# Patient Record
Sex: FEMALE | Race: WHITE | Employment: FULL TIME | URBAN - METROPOLITAN AREA
[De-identification: names, ages, dates, MRNs, and addresses within clinical notes are randomized per-mention and may not be internally consistent; named-entity substitution may affect disease eponyms.]

---

## 2017-10-19 ENCOUNTER — LAB ENCOUNTER (OUTPATIENT)
Dept: LAB | Age: 47
End: 2017-10-19
Attending: FAMILY MEDICINE
Payer: COMMERCIAL

## 2017-10-19 DIAGNOSIS — Z00.00 ROUTINE GENERAL MEDICAL EXAMINATION AT A HEALTH CARE FACILITY: Primary | ICD-10-CM

## 2017-10-19 PROCEDURE — 83036 HEMOGLOBIN GLYCOSYLATED A1C: CPT

## 2017-10-19 PROCEDURE — 84443 ASSAY THYROID STIM HORMONE: CPT

## 2017-10-19 PROCEDURE — 80061 LIPID PANEL: CPT

## 2017-10-19 PROCEDURE — 80053 COMPREHEN METABOLIC PANEL: CPT

## 2017-10-19 PROCEDURE — 85025 COMPLETE CBC W/AUTO DIFF WBC: CPT

## 2017-10-19 PROCEDURE — 36415 COLL VENOUS BLD VENIPUNCTURE: CPT

## 2017-10-31 ENCOUNTER — HOSPITAL ENCOUNTER (OUTPATIENT)
Dept: MAMMOGRAPHY | Age: 47
Discharge: HOME | End: 2017-10-31
Attending: FAMILY MEDICINE
Payer: COMMERCIAL

## 2017-10-31 DIAGNOSIS — Z12.31 ENCOUNTER FOR SCREENING MAMMOGRAM FOR MALIGNANT NEOPLASM OF BREAST: ICD-10-CM

## 2017-10-31 PROCEDURE — 77067 SCR MAMMO BI INCL CAD: CPT | Performed by: FAMILY MEDICINE

## 2017-11-18 ENCOUNTER — HOSPITAL ENCOUNTER (EMERGENCY)
Age: 47
Discharge: HOME | End: 2017-11-18
Attending: EMERGENCY MEDICINE
Payer: COMMERCIAL

## 2017-11-18 ENCOUNTER — APPOINTMENT (OUTPATIENT)
Dept: ULTRASOUND IMAGING | Age: 47
End: 2017-11-18
Attending: EMERGENCY MEDICINE
Payer: COMMERCIAL

## 2017-11-18 VITALS
TEMPERATURE: 98 F | BODY MASS INDEX: 43.32 KG/M2 | WEIGHT: 260 LBS | HEART RATE: 90 BPM | DIASTOLIC BLOOD PRESSURE: 80 MMHG | SYSTOLIC BLOOD PRESSURE: 135 MMHG | RESPIRATION RATE: 18 BRPM | HEIGHT: 65 IN | OXYGEN SATURATION: 98 %

## 2017-11-18 DIAGNOSIS — M79.604 PAIN IN RIGHT LEG: Primary | ICD-10-CM

## 2017-11-18 PROCEDURE — 99284 EMERGENCY DEPT VISIT MOD MDM: CPT

## 2017-11-18 PROCEDURE — 93971 EXTREMITY STUDY: CPT | Performed by: EMERGENCY MEDICINE

## 2017-11-18 RX ORDER — CELECOXIB 200 MG/1
200 CAPSULE ORAL 2 TIMES DAILY
Qty: 20 CAPSULE | Refills: 0 | Status: SHIPPED | OUTPATIENT
Start: 2017-11-18 | End: 2021-09-22

## 2017-11-18 RX ORDER — TRAMADOL HYDROCHLORIDE 50 MG/1
TABLET ORAL EVERY 4 HOURS PRN
Qty: 20 TABLET | Refills: 0 | Status: SHIPPED | OUTPATIENT
Start: 2017-11-18 | End: 2017-11-25

## 2017-11-18 RX ORDER — TRAMADOL HYDROCHLORIDE 50 MG/1
100 TABLET ORAL ONCE
Status: COMPLETED | OUTPATIENT
Start: 2017-11-18 | End: 2017-11-18

## 2017-11-19 NOTE — ED INITIAL ASSESSMENT (HPI)
Pt is having pain that radiates from her right calf into her posterior thigh. Pt states the pain is made worse by driving a car.

## 2017-11-19 NOTE — ED PROVIDER NOTES
Patient Seen in: [facility] Emergency Department In [place]    History   Patient presents with:  Back Pain (musculoskeletal)    Stated Complaint: leg/buttocks pain    HPI    77-year-old female presents for evaluation of right leg pain.   Patient describes (36.9 °C) (Temporal)   Resp 18   Ht 165.1 cm (5' 5\")   Wt 117.9 kg   LMP 10/31/2017 (Exact Date)   SpO2 98%   BMI 43.27 kg/m²         Physical Exam   Constitutional: She is oriented to person, place, and time. Cardiovascular: Intact distal pulses.     Pu (four) hours as needed for Pain., Script printed, Disp-20 tablet, R-0    celecoxib (CELEBREX) 200 MG Oral Cap  Take 1 capsule (200 mg total) by mouth 2 (two) times daily. , Normal, Disp-20 capsule, R-0

## 2019-10-23 ENCOUNTER — HOSPITAL ENCOUNTER (OUTPATIENT)
Dept: MAMMOGRAPHY | Age: 49
Discharge: HOME | End: 2019-10-23
Attending: FAMILY MEDICINE
Payer: COMMERCIAL

## 2019-10-23 DIAGNOSIS — Z12.31 ENCOUNTER FOR SCREENING MAMMOGRAM FOR MALIGNANT NEOPLASM OF BREAST: ICD-10-CM

## 2019-10-23 PROCEDURE — 77063 BREAST TOMOSYNTHESIS BI: CPT | Performed by: FAMILY MEDICINE

## 2019-10-23 PROCEDURE — 77067 SCR MAMMO BI INCL CAD: CPT | Performed by: FAMILY MEDICINE

## 2020-10-04 ENCOUNTER — HOSPITAL ENCOUNTER (OUTPATIENT)
Dept: MAMMOGRAPHY | Age: 50
Discharge: HOME | End: 2020-10-04
Attending: FAMILY MEDICINE
Payer: COMMERCIAL

## 2020-10-04 DIAGNOSIS — Z12.31 ENCOUNTER FOR SCREENING MAMMOGRAM FOR MALIGNANT NEOPLASM OF BREAST: ICD-10-CM

## 2020-10-04 PROCEDURE — 77067 SCR MAMMO BI INCL CAD: CPT | Performed by: FAMILY MEDICINE

## 2020-10-04 PROCEDURE — 77063 BREAST TOMOSYNTHESIS BI: CPT | Performed by: FAMILY MEDICINE

## 2021-06-13 ENCOUNTER — APPOINTMENT (OUTPATIENT)
Dept: ULTRASOUND IMAGING | Age: 51
End: 2021-06-13
Attending: EMERGENCY MEDICINE
Payer: COMMERCIAL

## 2021-06-13 ENCOUNTER — APPOINTMENT (OUTPATIENT)
Dept: GENERAL RADIOLOGY | Age: 51
End: 2021-06-13
Attending: EMERGENCY MEDICINE
Payer: COMMERCIAL

## 2021-06-13 ENCOUNTER — HOSPITAL ENCOUNTER (EMERGENCY)
Age: 51
Discharge: HOME | End: 2021-06-13
Attending: EMERGENCY MEDICINE
Payer: COMMERCIAL

## 2021-06-13 ENCOUNTER — APPOINTMENT (OUTPATIENT)
Dept: CT IMAGING | Age: 51
End: 2021-06-13
Attending: EMERGENCY MEDICINE
Payer: COMMERCIAL

## 2021-06-13 VITALS
BODY MASS INDEX: 43 KG/M2 | RESPIRATION RATE: 16 BRPM | OXYGEN SATURATION: 99 % | DIASTOLIC BLOOD PRESSURE: 81 MMHG | SYSTOLIC BLOOD PRESSURE: 130 MMHG | WEIGHT: 260 LBS | TEMPERATURE: 99 F | HEART RATE: 74 BPM

## 2021-06-13 DIAGNOSIS — R07.89 LEFT CHEST PRESSURE: Primary | ICD-10-CM

## 2021-06-13 PROCEDURE — 93971 EXTREMITY STUDY: CPT | Performed by: EMERGENCY MEDICINE

## 2021-06-13 PROCEDURE — 99285 EMERGENCY DEPT VISIT HI MDM: CPT

## 2021-06-13 PROCEDURE — 71045 X-RAY EXAM CHEST 1 VIEW: CPT | Performed by: EMERGENCY MEDICINE

## 2021-06-13 PROCEDURE — 85379 FIBRIN DEGRADATION QUANT: CPT | Performed by: EMERGENCY MEDICINE

## 2021-06-13 PROCEDURE — 71275 CT ANGIOGRAPHY CHEST: CPT | Performed by: EMERGENCY MEDICINE

## 2021-06-13 PROCEDURE — 85025 COMPLETE CBC W/AUTO DIFF WBC: CPT | Performed by: EMERGENCY MEDICINE

## 2021-06-13 PROCEDURE — 36415 COLL VENOUS BLD VENIPUNCTURE: CPT

## 2021-06-13 PROCEDURE — 93010 ELECTROCARDIOGRAM REPORT: CPT

## 2021-06-13 PROCEDURE — 84484 ASSAY OF TROPONIN QUANT: CPT | Performed by: EMERGENCY MEDICINE

## 2021-06-13 PROCEDURE — 80053 COMPREHEN METABOLIC PANEL: CPT | Performed by: EMERGENCY MEDICINE

## 2021-06-13 PROCEDURE — 93005 ELECTROCARDIOGRAM TRACING: CPT

## 2021-06-13 NOTE — ED PROVIDER NOTES
Patient Seen in: [facility] Emergency Department In [place]      History   Patient presents with:  Numbness Weakness    Stated Complaint: \"whole body doesnt feel right\"    HPI/Subjective:   HPI    58-year-old with a history of osteoarthritis, previous l • Visual impairment     glasses   • Wears glasses               Past Surgical History:   Procedure Laterality Date   • CHOLECYSTECTOMY     • COLONOSCOPY     • EYE SURGERY      eyelid cyst removal in [facility]   • KNEE ARTHROSCOPY Left 1980-1990s    3 of Alkaline Phosphatase 170 (*)     A/G Ratio 0.9 (*)     All other components within normal limits   D-DIMER - Abnormal; Notable for the following components:    D-Dimer 1.17 (*)     All other components within normal limits   CBC W/ DIFFERENTIAL - Abnorm the upper abdomen demonstrate cholecystectomy changes.     BONES:  Mild diffuse degenerative change.  No bony lesion or fracture.     OTHER:  Image degradation due to patient motion.      LLE venous doppler: No DVT  EXTREMITY EXAMINED:  Left lower extremit

## 2021-06-13 NOTE — ED INITIAL ASSESSMENT (HPI)
MULTIPLE COMPLAINTS. NUMBNESS TO A COUPLE TOES FOR ABOUT 1 WEEK.  PRESSURE BEHIND THE NECK, FRONT OF HEAD AND NEAR COLLARBONE. STATES \"JUST DONT FEEL RIGHT\"

## 2021-06-14 ENCOUNTER — TELEPHONE (OUTPATIENT)
Dept: CARDIOLOGY | Age: 51
End: 2021-06-14

## 2021-06-19 ENCOUNTER — LAB ENCOUNTER (OUTPATIENT)
Dept: LAB | Age: 51
End: 2021-06-19
Attending: INTERNAL MEDICINE
Payer: COMMERCIAL

## 2021-06-19 DIAGNOSIS — Z01.818 PRE-OP TESTING: ICD-10-CM

## 2021-06-22 PROBLEM — Z86.010 PERSONAL HISTORY OF COLONIC POLYPS: Status: ACTIVE | Noted: 2021-06-22

## 2021-06-22 PROBLEM — K63.5 COLON POLYP: Status: ACTIVE | Noted: 2021-06-22

## 2021-06-22 PROBLEM — R07.9 CHEST PAIN, UNSPECIFIED: Status: ACTIVE | Noted: 2021-06-22

## 2021-06-22 PROBLEM — K64.8 INTERNAL HEMORRHOIDS WITHOUT COMPLICATION: Status: ACTIVE | Noted: 2021-06-22

## 2021-06-22 PROBLEM — Z86.0100 PERSONAL HISTORY OF COLONIC POLYPS: Status: ACTIVE | Noted: 2021-06-22

## 2021-07-26 ENCOUNTER — ORDER TRANSCRIPTION (OUTPATIENT)
Dept: ADMINISTRATIVE | Facility: HOSPITAL | Age: 51
End: 2021-07-26

## 2021-07-26 DIAGNOSIS — Z13.6 SCREENING FOR CARDIOVASCULAR CONDITION: Primary | ICD-10-CM

## 2021-08-10 ENCOUNTER — HOSPITAL ENCOUNTER (OUTPATIENT)
Dept: CT IMAGING | Age: 51
Discharge: HOME | End: 2021-08-10
Attending: FAMILY MEDICINE

## 2021-08-10 DIAGNOSIS — Z13.6 SCREENING FOR CARDIOVASCULAR CONDITION: ICD-10-CM

## 2021-09-15 ENCOUNTER — LAB ENCOUNTER (OUTPATIENT)
Dept: LAB | Age: 51
End: 2021-09-15
Attending: INTERNAL MEDICINE
Payer: COMMERCIAL

## 2021-09-15 DIAGNOSIS — A04.8 HELICOBACTER PYLORI INFECTION: ICD-10-CM

## 2021-09-15 PROCEDURE — 83013 H PYLORI (C-13) BREATH: CPT

## 2021-09-17 LAB — H. PYLORI BREATH TEST: NEGATIVE

## 2021-09-17 NOTE — PROGRESS NOTES
Date: 2021    To: Bryan Rea  : 1970     I hope this letter finds you doing well. I am writing to inform you of the following:      The results of your recent breath test for H. Pylori was negative       Please call the office at [phone]

## 2021-09-20 ENCOUNTER — LAB ENCOUNTER (OUTPATIENT)
Dept: LAB | Age: 51
End: 2021-09-20
Attending: INTERNAL MEDICINE
Payer: COMMERCIAL

## 2021-09-20 DIAGNOSIS — Z98.890 HISTORY OF ESOPHAGOGASTRODUODENOSCOPY (EGD): ICD-10-CM

## 2021-09-21 LAB — SARS-COV-2 RNA RESP QL NAA+PROBE: NOT DETECTED

## 2021-09-23 PROBLEM — B96.81 HELICOBACTER PYLORI GASTRITIS: Status: ACTIVE | Noted: 2021-09-23

## 2021-09-23 PROBLEM — K25.3 GASTRIC ULCER, ACUTE: Status: ACTIVE | Noted: 2021-09-23

## 2021-09-23 PROBLEM — K29.70 HELICOBACTER PYLORI GASTRITIS: Status: ACTIVE | Noted: 2021-09-23

## 2021-10-10 ENCOUNTER — LAB ENCOUNTER (OUTPATIENT)
Dept: LAB | Facility: HOSPITAL | Age: 51
End: 2021-10-10
Attending: FAMILY MEDICINE
Payer: COMMERCIAL

## 2021-10-10 DIAGNOSIS — E53.9 VITAMIN B DEFICIENCY: ICD-10-CM

## 2021-10-10 DIAGNOSIS — Z00.00 ROUTINE GENERAL MEDICAL EXAMINATION AT A HEALTH CARE FACILITY: ICD-10-CM

## 2021-10-10 DIAGNOSIS — Z86.39 PERSONAL HISTORY OF NUTRITIONAL DEFICIENCY: Primary | ICD-10-CM

## 2021-10-10 DIAGNOSIS — E55.9 VITAMIN D DEFICIENCY: ICD-10-CM

## 2021-10-10 PROCEDURE — 80053 COMPREHEN METABOLIC PANEL: CPT

## 2021-10-10 PROCEDURE — 84443 ASSAY THYROID STIM HORMONE: CPT

## 2021-10-10 PROCEDURE — 80061 LIPID PANEL: CPT

## 2021-10-10 PROCEDURE — 82306 VITAMIN D 25 HYDROXY: CPT

## 2021-10-10 PROCEDURE — 83036 HEMOGLOBIN GLYCOSYLATED A1C: CPT

## 2021-10-10 PROCEDURE — 83550 IRON BINDING TEST: CPT

## 2021-10-10 PROCEDURE — 83540 ASSAY OF IRON: CPT

## 2021-10-10 PROCEDURE — 85025 COMPLETE CBC W/AUTO DIFF WBC: CPT

## 2021-10-10 PROCEDURE — 82607 VITAMIN B-12: CPT

## 2021-10-10 PROCEDURE — 36415 COLL VENOUS BLD VENIPUNCTURE: CPT

## 2021-10-28 ENCOUNTER — HOSPITAL ENCOUNTER (OUTPATIENT)
Dept: MAMMOGRAPHY | Age: 51
Discharge: HOME | End: 2021-10-28
Attending: FAMILY MEDICINE
Payer: COMMERCIAL

## 2021-10-28 DIAGNOSIS — Z12.31 VISIT FOR SCREENING MAMMOGRAM: ICD-10-CM

## 2021-10-28 PROCEDURE — 77063 BREAST TOMOSYNTHESIS BI: CPT | Performed by: FAMILY MEDICINE

## 2021-10-28 PROCEDURE — 77067 SCR MAMMO BI INCL CAD: CPT | Performed by: FAMILY MEDICINE

## 2022-10-31 ENCOUNTER — HOSPITAL ENCOUNTER (OUTPATIENT)
Dept: MAMMOGRAPHY | Age: 52
Discharge: HOME | End: 2022-10-31
Attending: FAMILY MEDICINE
Payer: COMMERCIAL

## 2022-10-31 DIAGNOSIS — Z12.31 ENCOUNTER FOR SCREENING MAMMOGRAM FOR MALIGNANT NEOPLASM OF BREAST: ICD-10-CM

## 2022-10-31 PROCEDURE — 77067 SCR MAMMO BI INCL CAD: CPT | Performed by: FAMILY MEDICINE

## 2022-10-31 PROCEDURE — 77063 BREAST TOMOSYNTHESIS BI: CPT | Performed by: FAMILY MEDICINE

## 2023-11-02 ENCOUNTER — HOSPITAL ENCOUNTER (OUTPATIENT)
Dept: MAMMOGRAPHY | Age: 53
Discharge: HOME | End: 2023-11-02
Attending: FAMILY MEDICINE
Payer: COMMERCIAL

## 2023-11-02 DIAGNOSIS — Z12.31 ENCOUNTER FOR SCREENING MAMMOGRAM FOR MALIGNANT NEOPLASM OF BREAST: ICD-10-CM

## 2023-11-02 PROCEDURE — 77067 SCR MAMMO BI INCL CAD: CPT | Performed by: FAMILY MEDICINE

## 2023-11-02 PROCEDURE — 77063 BREAST TOMOSYNTHESIS BI: CPT | Performed by: FAMILY MEDICINE

## 2024-09-27 ENCOUNTER — LAB ENCOUNTER (OUTPATIENT)
Dept: LAB | Age: 54
End: 2024-09-27
Attending: NURSE PRACTITIONER
Payer: COMMERCIAL

## 2024-09-27 DIAGNOSIS — E78.01 FAMILIAL HYPERCHOLESTEROLEMIA: Primary | ICD-10-CM

## 2024-09-27 LAB
ALBUMIN SERPL-MCNC: 4.7 G/DL (ref 3.2–4.8)
ALBUMIN/GLOB SERPL: 1.6 {RATIO} (ref 1–2)
ALP LIVER SERPL-CCNC: 155 U/L
ALT SERPL-CCNC: 11 U/L
ANION GAP SERPL CALC-SCNC: 7 MMOL/L (ref 0–18)
AST SERPL-CCNC: 18 U/L (ref ?–34)
BILIRUB SERPL-MCNC: 0.6 MG/DL (ref 0.3–1.2)
BUN BLD-MCNC: 10 MG/DL (ref 9–23)
CALCIUM BLD-MCNC: 10.1 MG/DL (ref 8.7–10.4)
CHLORIDE SERPL-SCNC: 106 MMOL/L (ref 98–112)
CHOLEST SERPL-MCNC: 211 MG/DL (ref ?–200)
CO2 SERPL-SCNC: 28 MMOL/L (ref 21–32)
CREAT BLD-MCNC: 0.82 MG/DL
EGFRCR SERPLBLD CKD-EPI 2021: 84 ML/MIN/1.73M2 (ref 60–?)
FASTING PATIENT LIPID ANSWER: YES
FASTING STATUS PATIENT QL REPORTED: YES
GLOBULIN PLAS-MCNC: 2.9 G/DL (ref 2–3.5)
GLUCOSE BLD-MCNC: 85 MG/DL (ref 70–99)
HDLC SERPL-MCNC: 51 MG/DL (ref 40–59)
LDLC SERPL CALC-MCNC: 144 MG/DL (ref ?–100)
NONHDLC SERPL-MCNC: 160 MG/DL (ref ?–130)
OSMOLALITY SERPL CALC.SUM OF ELEC: 290 MOSM/KG (ref 275–295)
POTASSIUM SERPL-SCNC: 4.2 MMOL/L (ref 3.5–5.1)
PROT SERPL-MCNC: 7.6 G/DL (ref 5.7–8.2)
SODIUM SERPL-SCNC: 141 MMOL/L (ref 136–145)
TRIGL SERPL-MCNC: 90 MG/DL (ref 30–149)
VLDLC SERPL CALC-MCNC: 17 MG/DL (ref 0–30)

## 2024-09-27 PROCEDURE — 80053 COMPREHEN METABOLIC PANEL: CPT

## 2024-09-27 PROCEDURE — 80061 LIPID PANEL: CPT

## 2024-09-27 PROCEDURE — 36415 COLL VENOUS BLD VENIPUNCTURE: CPT

## 2024-11-09 ENCOUNTER — HOSPITAL ENCOUNTER (OUTPATIENT)
Dept: MAMMOGRAPHY | Age: 54
Discharge: HOME | End: 2024-11-09
Attending: FAMILY MEDICINE
Payer: COMMERCIAL

## 2024-11-09 DIAGNOSIS — Z12.31 ENCOUNTER FOR SCREENING MAMMOGRAM FOR MALIGNANT NEOPLASM OF BREAST: ICD-10-CM

## 2024-11-09 PROCEDURE — 77067 SCR MAMMO BI INCL CAD: CPT | Performed by: FAMILY MEDICINE

## 2024-11-09 PROCEDURE — 77063 BREAST TOMOSYNTHESIS BI: CPT | Performed by: FAMILY MEDICINE

## 2024-11-22 ENCOUNTER — APPOINTMENT (OUTPATIENT)
Dept: CT IMAGING | Age: 54
End: 2024-11-22
Attending: EMERGENCY MEDICINE
Payer: COMMERCIAL

## 2024-11-22 ENCOUNTER — APPOINTMENT (OUTPATIENT)
Dept: GENERAL RADIOLOGY | Age: 54
End: 2024-11-22
Attending: EMERGENCY MEDICINE
Payer: COMMERCIAL

## 2024-11-22 ENCOUNTER — HOSPITAL ENCOUNTER (OUTPATIENT)
Facility: HOSPITAL | Age: 54
Setting detail: OBSERVATION
Discharge: HOME | End: 2024-11-23
Attending: EMERGENCY MEDICINE | Admitting: HOSPITALIST
Payer: COMMERCIAL

## 2024-11-22 DIAGNOSIS — R07.89 CHEST PAIN, ATYPICAL: Primary | ICD-10-CM

## 2024-11-22 DIAGNOSIS — R00.0 RAPID HEART RATE: ICD-10-CM

## 2024-11-22 DIAGNOSIS — R03.0 ELEVATED BLOOD PRESSURE READING: ICD-10-CM

## 2024-11-22 LAB
ALBUMIN SERPL-MCNC: 4.9 G/DL (ref 3.2–4.8)
ALBUMIN/GLOB SERPL: 1.6 {RATIO} (ref 1–2)
ALP LIVER SERPL-CCNC: 162 U/L
ALT SERPL-CCNC: 14 U/L
ANION GAP SERPL CALC-SCNC: 8 MMOL/L (ref 0–18)
APTT PPP: 24.8 SECONDS (ref 23–36)
AST SERPL-CCNC: 19 U/L (ref ?–34)
BASOPHILS # BLD AUTO: 0.1 X10(3) UL (ref 0–0.2)
BASOPHILS NFR BLD AUTO: 0.6 %
BILIRUB SERPL-MCNC: 0.5 MG/DL (ref 0.3–1.2)
BUN BLD-MCNC: 7 MG/DL (ref 9–23)
CALCIUM BLD-MCNC: 10.3 MG/DL (ref 8.7–10.4)
CHLORIDE SERPL-SCNC: 105 MMOL/L (ref 98–112)
CO2 SERPL-SCNC: 26 MMOL/L (ref 21–32)
CREAT BLD-MCNC: 0.86 MG/DL
D DIMER PPP FEU-MCNC: 1.16 UG/ML FEU (ref ?–0.55)
EGFRCR SERPLBLD CKD-EPI 2021: 80 ML/MIN/1.73M2 (ref 60–?)
EOSINOPHIL # BLD AUTO: 0.44 X10(3) UL (ref 0–0.7)
EOSINOPHIL NFR BLD AUTO: 2.6 %
ERYTHROCYTE [DISTWIDTH] IN BLOOD BY AUTOMATED COUNT: 12.9 %
GLOBULIN PLAS-MCNC: 3.1 G/DL (ref 2–3.5)
GLUCOSE BLD-MCNC: 86 MG/DL (ref 70–99)
HCT VFR BLD AUTO: 48.3 %
HGB BLD-MCNC: 16.8 G/DL
IMM GRANULOCYTES # BLD AUTO: 0.08 X10(3) UL (ref 0–1)
IMM GRANULOCYTES NFR BLD: 0.5 %
INR BLD: 0.93 (ref 0.8–1.2)
LYMPHOCYTES # BLD AUTO: 4.99 X10(3) UL (ref 1–4)
LYMPHOCYTES NFR BLD AUTO: 29.5 %
MCH RBC QN AUTO: 30.9 PG (ref 26–34)
MCHC RBC AUTO-ENTMCNC: 34.8 G/DL (ref 31–37)
MCV RBC AUTO: 89 FL
MONOCYTES # BLD AUTO: 1.66 X10(3) UL (ref 0.1–1)
MONOCYTES NFR BLD AUTO: 9.8 %
MORPHOLOGY: NORMAL
NEUTROPHILS # BLD AUTO: 9.66 X10 (3) UL (ref 1.5–7.7)
NEUTROPHILS # BLD AUTO: 9.66 X10(3) UL (ref 1.5–7.7)
NEUTROPHILS NFR BLD AUTO: 57 %
OSMOLALITY SERPL CALC.SUM OF ELEC: 285 MOSM/KG (ref 275–295)
PLATELET # BLD AUTO: 310 10(3)UL (ref 150–450)
PLATELET MORPHOLOGY: NORMAL
POTASSIUM SERPL-SCNC: 3.2 MMOL/L (ref 3.5–5.1)
PROT SERPL-MCNC: 8 G/DL (ref 5.7–8.2)
PROTHROMBIN TIME: 12.3 SECONDS (ref 11.6–14.8)
RBC # BLD AUTO: 5.43 X10(6)UL
SODIUM SERPL-SCNC: 139 MMOL/L (ref 136–145)
T4 FREE SERPL-MCNC: 1.2 NG/DL (ref 0.8–1.7)
TROPONIN I SERPL HS-MCNC: <3 NG/L
TSI SER-ACNC: 3.51 UIU/ML (ref 0.55–4.78)
WBC # BLD AUTO: 16.9 X10(3) UL (ref 4–11)

## 2024-11-22 PROCEDURE — 99223 1ST HOSP IP/OBS HIGH 75: CPT | Performed by: HOSPITALIST

## 2024-11-22 PROCEDURE — 70450 CT HEAD/BRAIN W/O DYE: CPT | Performed by: EMERGENCY MEDICINE

## 2024-11-22 PROCEDURE — 71275 CT ANGIOGRAPHY CHEST: CPT | Performed by: EMERGENCY MEDICINE

## 2024-11-22 PROCEDURE — 71045 X-RAY EXAM CHEST 1 VIEW: CPT | Performed by: EMERGENCY MEDICINE

## 2024-11-22 RX ORDER — ONDANSETRON 2 MG/ML
4 INJECTION INTRAMUSCULAR; INTRAVENOUS EVERY 6 HOURS PRN
Status: DISCONTINUED | OUTPATIENT
Start: 2024-11-22 | End: 2024-11-23

## 2024-11-22 RX ORDER — CETIRIZINE HYDROCHLORIDE 10 MG/1
10 TABLET ORAL DAILY
Status: DISCONTINUED | OUTPATIENT
Start: 2024-11-23 | End: 2024-11-23

## 2024-11-22 RX ORDER — IOHEXOL 350 MG/ML
100 INJECTION, SOLUTION INTRAVENOUS
Status: COMPLETED | OUTPATIENT
Start: 2024-11-22 | End: 2024-11-22

## 2024-11-22 RX ORDER — ATORVASTATIN CALCIUM 10 MG/1
10 TABLET, FILM COATED ORAL NIGHTLY
Status: DISCONTINUED | OUTPATIENT
Start: 2024-11-22 | End: 2024-11-23

## 2024-11-22 RX ORDER — POTASSIUM CHLORIDE 1500 MG/1
40 TABLET, EXTENDED RELEASE ORAL ONCE
Status: COMPLETED | OUTPATIENT
Start: 2024-11-22 | End: 2024-11-22

## 2024-11-22 RX ORDER — SODIUM CHLORIDE 9 MG/ML
INJECTION, SOLUTION INTRAVENOUS CONTINUOUS
Status: ACTIVE | OUTPATIENT
Start: 2024-11-22 | End: 2024-11-22

## 2024-11-22 RX ORDER — ASPIRIN 81 MG/1
324 TABLET, CHEWABLE ORAL ONCE
Status: COMPLETED | OUTPATIENT
Start: 2024-11-22 | End: 2024-11-22

## 2024-11-22 RX ORDER — HYDRALAZINE HYDROCHLORIDE 20 MG/ML
10 INJECTION INTRAMUSCULAR; INTRAVENOUS EVERY 4 HOURS PRN
Status: DISCONTINUED | OUTPATIENT
Start: 2024-11-22 | End: 2024-11-23

## 2024-11-22 RX ORDER — ACETAMINOPHEN 500 MG
500 TABLET ORAL EVERY 4 HOURS PRN
Status: DISCONTINUED | OUTPATIENT
Start: 2024-11-22 | End: 2024-11-23

## 2024-11-22 RX ORDER — LABETALOL HYDROCHLORIDE 5 MG/ML
20 INJECTION, SOLUTION INTRAVENOUS ONCE
Status: COMPLETED | OUTPATIENT
Start: 2024-11-22 | End: 2024-11-22

## 2024-11-22 RX ORDER — ECHINACEA PURPUREA EXTRACT 125 MG
1 TABLET ORAL
Status: DISCONTINUED | OUTPATIENT
Start: 2024-11-22 | End: 2024-11-23

## 2024-11-22 RX ORDER — PROCHLORPERAZINE EDISYLATE 5 MG/ML
5 INJECTION INTRAMUSCULAR; INTRAVENOUS EVERY 8 HOURS PRN
Status: DISCONTINUED | OUTPATIENT
Start: 2024-11-22 | End: 2024-11-23

## 2024-11-22 RX ORDER — ENOXAPARIN SODIUM 100 MG/ML
40 INJECTION SUBCUTANEOUS DAILY
Status: DISCONTINUED | OUTPATIENT
Start: 2024-11-23 | End: 2024-11-23

## 2024-11-22 RX ORDER — LABETALOL HYDROCHLORIDE 5 MG/ML
20 INJECTION, SOLUTION INTRAVENOUS EVERY 6 HOURS PRN
Status: DISCONTINUED | OUTPATIENT
Start: 2024-11-22 | End: 2024-11-23

## 2024-11-22 NOTE — ED PROVIDER NOTES
Patient Seen in: [facility] In [place]      History     Chief Complaint   Patient presents with    Chest Pain Angina    Headache    Hypertension     Stated Complaint: headache, chest pain, hypertension    Subjective:   HPI      Patient is a 54-year-old female presents emergency room with a history of multiple complaints.  The patient states she is under some stress recently secondary to problems with her 's medical care she states that she developed some chest pain approximately 30 minutes ago that she was seated when it started she began having pain in the front of her chest without radiation anywhere she began having some shortness of breath she took one of her husbands nitroglycerin with some improvement.  The patient states that she also had a headache.  The patient found to have hypertension here in the ER.  The patient states she typically does not take medications for blood pressure.  The patient denies history of any thyroid problems in the past.  Patient denies history of any abdominal pain.  The patient denies history of any other somatic complaints or discomfort at this time.  The patient denies history of any new medication she is taking.    Objective:     Past Medical History:    Arthritis    Body piercing    Constipation    Decorative tattoo    Flatulence/gas pain/belching    Hematuria of undiagnosed cause    Hemorrhoids    Osteoarthrosis, unspecified whether generalized or localized, unspecified site    Pain in joints    Sleep apnea    Visual impairment    glasses    Wears glasses              Past Surgical History:   Procedure Laterality Date    Cholecystectomy      Colonoscopy      Egd      Eye surgery      eyelid cyst removal in highschool    Knee arthroscopy Left 1980-1990s    3 of them     Knee arthroscopy Right     1    Knee replacement surgery Left     Removal gallbladder                  Social History     Socioeconomic History    Marital status:    Tobacco  Use    Smoking status: Never    Smokeless tobacco: Never   Substance and Sexual Activity    Alcohol use: Yes     Alcohol/week: 0.0 standard drinks of alcohol     Comment: social    Drug use: No     Social Drivers of Health      Received from [facility], [facility]    Social Connections   Housing Stability: Not At Risk (4/19/2024)    Received from [address]    [facility]     What is your living situation today?: I have a steady place to live                  Physical Exam     ED Triage Vitals [11/22/24 1601]   BP (!) 186/125   Pulse (!) 122   Resp 20   Temp 98.9 °F (37.2 °C)   Temp src Oral   SpO2 100 %   O2 Device None (Room air)       Current Vitals:   Vital Signs  BP: 159/76  Pulse: 89  Resp: 17  Temp: 98.9 °F (37.2 °C)  Temp src: Oral    Oxygen Therapy  SpO2: 98 %  O2 Device: None (Room air)        Physical Exam  GENERAL: Well-developed, well-nourished female sitting up breathing easily in no apparent distress.  Patient is nontoxic in appearance.  HEENT: Head is normocephalic, atraumatic. Pupils are 4 mm equally round and reactive to light. Oropharynx is clear. Mucous membranes are moist.  No evidence of any facial asymmetry appreciated.  NECK:  No stridor.  LUNGS: Clear to auscultation bilaterally with no wheeze. There is good equal air entry bilaterally.  HEART: Regular rhythm with a tachycardic rate. Normal S1, S2 no S3, or S4. No murmur.  ABDOMEN: There is no focal tenderness to palpation appreciated anywhere throughout the abdomen. There is no guarding, no rebound, no mass, and no organomegaly appreciated. There is normoactive bowel sounds. There is no hernia.  EXTREMITIES: There is no cyanosis, clubbing, or edema appreciated. Pulses are 2+ and equal in all 4 extremities.  NEURO: Patient is awake, alert and oriented to time place and person. Motor strength is 5 over 5 in all 4 extremities. There are no gross motor or sensory deficits appreciated. Cranial nerves II  through XII are intact.  Patient answering all questions appropriately.          ED Course     Labs Reviewed   COMP METABOLIC PANEL (14) - Abnormal; Notable for the following components:       Result Value    Potassium 3.2 (*)     BUN 7 (*)     Alkaline Phosphatase 162 (*)     Albumin 4.9 (*)     All other components within normal limits   CBC WITH DIFFERENTIAL WITH PLATELET - Abnormal; Notable for the following components:    WBC 16.9 (*)     RBC 5.43 (*)     HGB 16.8 (*)     HCT 48.3 (*)     Neutrophil Absolute Prelim 9.66 (*)     Neutrophil Absolute 9.66 (*)     Lymphocyte Absolute 4.99 (*)     Monocyte Absolute 1.66 (*)     All other components within normal limits   D-DIMER - Abnormal; Notable for the following components:    D-Dimer 1.16 (*)     All other components within normal limits   RBC MORPHOLOGY SCAN - Abnormal; Notable for the following components:    Reactive Lymphs Small (*)     All other components within normal limits   TROPONIN I HIGH SENSITIVITY - Normal   PROTHROMBIN TIME (PT) - Normal   PTT, ACTIVATED - Normal   SCAN SLIDE   FREE T4 (FREE THYROXINE)   ASSAY, THYROID STIM HORMONE   RAINBOW DRAW BLUE     EKG    Rate, intervals and axes as noted on EKG Report.  Rate: 124  Rhythm: Sinus Rhythm  Reading: Nonspecific ST change no acute ST elevation appreciated.         I personally reviewed the patient's chest x-ray images my individual interpretation shows no evidence of any acute infiltrate.  I also reviewed official radiology report which showed results as noted below.       CTA CHEST (CPT=71275)    Result Date: 11/22/2024  CONCLUSION:  No acute pulmonary embolism or other acute process.    LOCATION:  [facility]   Dictated by (CST): Moisés Pena MD on 11/22/2024 at 6:17 PM     Finalized by (CST): Moisés Pena MD on 11/22/2024 at 6:18 PM       CT BRAIN OR HEAD (CPT=70450)    Result Date: 11/22/2024  CONCLUSION:  No acute intracranial abnormality. If there is clinical concern for acute  ischemia/infarction, an MRI of the brain would be recommended for further evaluation.    LOCATION:  [facility]   Dictated by (CST): Stromberg, LeRoy, MD on 11/22/2024 at 5:15 PM     Finalized by (CST): Stromberg, LeRoy, MD on 11/22/2024 at 5:17 PM       XR CHEST AP PORTABLE  (CPT=71045)    Result Date: 11/22/2024  CONCLUSION:  No active cardiopulmonary process identified.   LOCATION:  [facility]      Dictated by (CST): Stromberg, LeRoy, MD on 11/22/2024 at 4:39 PM     Finalized by (CST): Stromberg, LeRoy, MD on 11/22/2024 at 4:40 PM       TREVOR SALVATORE 2D+3D SCREENING BILAT (CPT=77067/68123)    Result Date: 11/9/2024  CONCLUSION:   BI-RADS CATEGORY:  DIAGNOSTIC CATEGORY 1 - NEGATIVE ASSESSMENT.   RECOMMENDATIONS:  ROUTINE MAMMOGRAM AND CLINICAL EVALUATION IN 12 MONTHS.       A letter explaining the results in lay terms has been sent to the patient.  This exam was evaluated with a computer-aided device.  This patient's information has been entered into a reminder system with a target due date for the next mammogram.   LOCATION:  [facility]   Dictated by (CST): Michael Herman MD on 11/09/2024 at 8:18 PM     Finalized by (CST): Michael Herman MD on 11/09/2024 at 8:20 PM   [facility] [address] [address] [phone]          [facility]          16:55 patient's pulse and blood pressure improved at this time.  Patient with no other new complaints at this time.  Patient feeling much better at this time.  Will continue to observe at this time.  18:52 patient sitting back and breathing easily in no apparent distress this time.  The patient will be admitted for further care at this time.  Patient blood pressure and pulse are improved at this time.  Patient with no pain on repeat examination.    Admission disposition: 11/22/2024  6:33 PM           Medical Decision Making  Patient had an IV line established blood work drawn including a CBC, chemistries, BUN/creatinine, and blood sugar showed evidence of elevated  white blood count of 16.9 elevated hemoglobin of 16.8 patient's potassium was somewhat low at 3.2 for which she received oral potassium in the ER.  Patient's liver function test and troponin as well as coags are unremarkable.  Patient's D-dimer is 1.16.  The patient is sitting back and breathing easily in no apparent distress at this time.  The patient was placed on a continuous pulse ox and cardiac monitor.  Patient was given IV labetalol here in the emergency room.  The patient had improvement in blood pressure and pulse after this was given.  Patient given aspirin here in the emergency room.  The patient had no further new complaints throughout the rest of the emergency room stay.  Patient denies history of any chest pain on repeat examination.  Patient will be admitted to the hospital for further observation and treatment at this time.  Patient denies any chest pain or repeat examination.  Patient's case discussed with Singh \[facility] [place] [facility]\""ist and the patient will be admitted for further care at this time.  Patient admitted with no further new complaints  Disposition and Plan     Clinical Impression:  1. Chest pain, atypical    2. Elevated blood pressure reading    3. Rapid heart rate         Disposition:  Admit  11/22/2024  6:33 pm    Follow-up:  No follow-up provider specified.        Medications Prescribed:  Current Discharge Medication List              Supplementary Documentation:         Hospital Problems       Present on Admission  Date Reviewed: 4/7/2022            ICD-10-CM Noted POA    * (Principal) Chest pain, atypical R07.89 11/22/2024 Unknown

## 2024-11-22 NOTE — ED INITIAL ASSESSMENT (HPI)
Patient here with chest pain that started approximately twenty minutes ago. States it started while seated. Took one tablet of nitroglycerin. Started as burning pain, but states less severe now. Headache as well.

## 2024-11-23 ENCOUNTER — APPOINTMENT (OUTPATIENT)
Dept: CV DIAGNOSTICS | Facility: HOSPITAL | Age: 54
End: 2024-11-23
Attending: HOSPITALIST
Payer: COMMERCIAL

## 2024-11-23 ENCOUNTER — APPOINTMENT (OUTPATIENT)
Dept: CV DIAGNOSTICS | Facility: HOSPITAL | Age: 54
End: 2024-11-23
Attending: NURSE PRACTITIONER
Payer: COMMERCIAL

## 2024-11-23 VITALS
OXYGEN SATURATION: 98 % | WEIGHT: 293 LBS | HEIGHT: 65 IN | HEART RATE: 75 BPM | BODY MASS INDEX: 48.82 KG/M2 | SYSTOLIC BLOOD PRESSURE: 143 MMHG | TEMPERATURE: 98 F | RESPIRATION RATE: 17 BRPM | DIASTOLIC BLOOD PRESSURE: 78 MMHG

## 2024-11-23 LAB
ATRIAL RATE: 124 BPM
P AXIS: 57 DEGREES
P-R INTERVAL: 156 MS
Q-T INTERVAL: 320 MS
QRS DURATION: 86 MS
QTC CALCULATION (BEZET): 459 MS
R AXIS: -10 DEGREES
T AXIS: 69 DEGREES
TROPONIN I SERPL HS-MCNC: <3 NG/L
VENTRICULAR RATE: 124 BPM

## 2024-11-23 PROCEDURE — 93018 CV STRESS TEST I&R ONLY: CPT | Performed by: NURSE PRACTITIONER

## 2024-11-23 PROCEDURE — 93017 CV STRESS TEST TRACING ONLY: CPT | Performed by: NURSE PRACTITIONER

## 2024-11-23 PROCEDURE — 93306 TTE W/DOPPLER COMPLETE: CPT | Performed by: HOSPITALIST

## 2024-11-23 PROCEDURE — 99239 HOSP IP/OBS DSCHRG MGMT >30: CPT | Performed by: HOSPITALIST

## 2024-11-23 PROCEDURE — 78452 HT MUSCLE IMAGE SPECT MULT: CPT | Performed by: NURSE PRACTITIONER

## 2024-11-23 RX ORDER — REGADENOSON 0.08 MG/ML
INJECTION, SOLUTION INTRAVENOUS
Status: COMPLETED
Start: 2024-11-23 | End: 2024-11-23

## 2024-11-23 RX ORDER — POTASSIUM CHLORIDE 1500 MG/1
40 TABLET, EXTENDED RELEASE ORAL EVERY 4 HOURS
Status: DISCONTINUED | OUTPATIENT
Start: 2024-11-23 | End: 2024-11-23

## 2024-11-23 NOTE — PLAN OF CARE
Prelim  nuc negative perfusion, EF 71%. Echo with LVEF 60-65%, no rwmas.     Ok for dc from cardiology perspective.   Juliet Bunn, APRN  11/23/2024  2:45 PM

## 2024-11-23 NOTE — CONSULTS
[facility]  Cardiology Consultation    Clarisse Abbasi Patient Status:  Observation    1970 MRN [id number]   Location [facility] 0SW-A Attending Marleen Krishnan MD   Hosp Day # 0 PCP Mery Lopez MD     Reason for Consultation:  Chest pain, lightheadednessm dizziness      History of Present Illness:  Clarisse Abbasi is a a(n) 54 year old female who presents with chest pain    She noted that she was flushed with dizziness and also some chest tightness which started at rest. Felt like a burning sensation. Lasted 20 minutes. She took NTG with no improvement.   States feltlike reflux that she had a feew years ago  She has a history of morbid obesity  She is not active at all. Has right knee replacemetn  Does not walk a lot    Labs:   K 3.2  Cr 0.86      History:  Past Medical History:    Arthritis    Body piercing    Constipation    Decorative tattoo    Flatulence/gas pain/belching    Hematuria of undiagnosed cause    Hemorrhoids    Osteoarthrosis, unspecified whether generalized or localized, unspecified site    Pain in joints    Sleep apnea    Visual impairment    glasses    Wears glasses     Past Surgical History:   Procedure Laterality Date    Cholecystectomy      Colonoscopy      Egd      Eye surgery      eyelid cyst removal in highschool    Knee arthroscopy Left -    3 of them     Knee arthroscopy Right     1    Knee replacement surgery Left     Removal gallbladder       Family History   Problem Relation Age of Onset    Diabetes Maternal Grandmother     Heart Attack Maternal Grandfather     Diabetes Maternal Grandfather     Alcohol and Other Disorders Associated Father       reports that she has never smoked. She has never used smokeless tobacco. She reports current alcohol use. She reports that she does not use drugs.    Allergies:  Allergies[1]    Medications:    Current Facility-Administered Medications:     atorvastatin (Lipitor) tab 10 mg, 10 mg, Oral, Nightly    cetirizine  (ZyrTEC) tab 10 mg, 10 mg, Oral, Daily    acetaminophen (Tylenol Extra Strength) tab 500 mg, 500 mg, Oral, Q4H PRN    melatonin tab 3 mg, 3 mg, Oral, Nightly PRN    glycerin-hypromellose- (Artificial Tears) 0.2-0.2-1 % ophthalmic solution 1 drop, 1 drop, Both Eyes, QID PRN    sodium chloride (Saline Mist) 0.65 % nasal solution 1 spray, 1 spray, Each Nare, Q3H PRN    enoxaparin (Lovenox) 40 MG/0.4ML SUBQ injection 40 mg, 40 mg, Subcutaneous, Daily    ondansetron (Zofran) 4 MG/2ML injection 4 mg, 4 mg, Intravenous, Q6H PRN    prochlorperazine (Compazine) 10 MG/2ML injection 5 mg, 5 mg, Intravenous, Q8H PRN    hydrALAzine (Apresoline) 20 mg/mL injection 10 mg, 10 mg, Intravenous, Q4H PRN    labetalol (Trandate) 5 mg/mL injection 20 mg, 20 mg, Intravenous, Q6H PRN    Review of Systems:  A comprehensive review of systems was negative if not otherwise mention in above HPI.    /80 (BP Location: Right arm)   Pulse 81   Temp 97.5 °F (36.4 °C) (Oral)   Resp 18   Ht 5' 5\" (1.651 m)   Wt 300 lb (136.1 kg)   SpO2 99%   BMI 49.92 kg/m²   Temp (24hrs), Av.2 °F (36.8 °C), Min:97.5 °F (36.4 °C), Max:98.9 °F (37.2 °C)       Intake/Output Summary (Last 24 hours) at 2024 0924  Last data filed at 2024 0830  Gross per 24 hour   Intake 240 ml   Output --   Net 240 ml     Wt Readings from Last 3 Encounters:   24 300 lb (136.1 kg)   21 260 lb (117.9 kg)   21 260 lb (117.9 kg)       Physical Exam:   General: Alert and oriented x 3. No apparent distress. No respiratory or constitutional distress.  HEENT: Normocephalic, anicteric sclera, neck supple.  Neck: No JVD, carotids 2+, no bruits.  Cardiac: Regular rate and rhythm. S1, S2 normal. No murmur, pericardial rub, S3.  Lungs: Clear without wheezes, rales, rhonchi or dullness.  Normal excursions and effort.  Abdomen: Soft, non-tender. .  Extremities: Without clubbing, cyanosis or edema.  Peripheral pulses are 2+.  Neurologic: Alert and  oriented, normal affect.  Skin: Warm and dry.     Laboratory Data:  Lab Results   Component Value Date    WBC 16.9 11/22/2024    HGB 16.8 11/22/2024    HCT 48.3 11/22/2024    .0 11/22/2024    CREATSERUM 0.86 11/22/2024    BUN 7 11/22/2024     11/22/2024    K 3.2 11/22/2024     11/22/2024    CO2 26.0 11/22/2024    GLU 86 11/22/2024    CA 10.3 11/22/2024    ALB 4.9 11/22/2024    ALKPHO 162 11/22/2024    BILT 0.5 11/22/2024    TP 8.0 11/22/2024    AST 19 11/22/2024    ALT 14 11/22/2024    PTT 24.8 11/22/2024    INR 0.93 11/22/2024    PTP 12.3 11/22/2024    T4F 1.2 11/22/2024    TSH 3.513 11/22/2024    DDIMER 1.16 11/22/2024     Recent Labs   Lab 11/22/24  1605   TROPHS <3         Imaging:  Reviewed  CTA chest negative for PE  Impression:    Atypical chest pain  Morbid obesity  Dyslipidemia   Hypertension    Recommendations:    Pain is atypical but her EKG noted to have nonspecific anterolateral changes.   Discussed further options with modalities of ischemic evaluation (both ischemic and nonischemic). Discussed stress testing.Will proceed with nuclear stress test    Will follow up on echo    Cont with statin    Thank you for allowing me to participate in the care of your patient.    Carla Gamble MD  11/23/2024  9:24 AM         [1]   Allergies  Allergen Reactions    Penicillins      As a child, unknown reaction

## 2024-11-23 NOTE — DISCHARGE SUMMARY
[facility]     Clarisse Abbasi Patient Status:  Observation    1970 MRN [id number]   Location [facility] 0SW-A Attending Marleen Krishnan MD   Hosp Day # 0 PCP Mery Lopez MD     Date of Admission: 2024  Date of Discharge:   2024    Discharge Disposition: Home or Self Care    Discharge Diagnosis:  Noncardiac chest pain  Hypertension  Dyslipidemia  Leukocytosis  Morbid obesity  Hypokalemia  NERY      History of Present Illness:   Clarisse Abbasi is a 54 year old female with a significant for morbid obesity, osteoarthrosis, NERY, dyslipidemia presents to the ER with complaint of chest pain that started approximately 20 minutes prior to arrival.  Patient was seated at the time.  She describes the pain as substernal and burning in sensation.  She took a tablet of nitroglycerin with some improvement.  She also complained of a headache.  Denies radiation of the pain.  She denies associated nausea, vomiting, lightheadedness, dizziness.  She complains of mild shortness of breath as well.  Upon arrival, patient's blood pressure was noted to be elevated.  She denies previous episodes of hypertension.  Blood work was overall unremarkable For mildly elevated D-dimer, troponin was normal.  Chest x-ray and CTA were both unremarkable as well.  Patient received labetalol in the ED with improvement of her blood pressure.  She c/o pressure in her ears but overall feels much better.     Brief Synopsis: Patient is a 54-year-old female admitted with atypical chest pain.  She was placed on a trial of Protonix.  She had a 2D echocardiogram and a stress test which were unremarkable.  She was evaluated by cardiology during her hospitalization.  She remained stable and was discharged home after workup was unremarkable.    Lace+ Score: 31  59-90 High Risk  29-58 Medium Risk  0-28   Low Risk       TCM Follow-Up Recommendation:  LACE 29-58: Moderate Risk of readmission after discharge from the  [facility].      Procedures during hospitalization:   None    Consultants:  Cardiology    Discharge Medication List:     Discharge Medications        ASK your doctor about these medications        Instructions Prescription details   atorvastatin 10 MG Tabs  Commonly known as: Lipitor      Take 1 tablet (10 mg total) by mouth nightly.   Refills: 0     fexofenadine 180 MG Tabs  Commonly known as: Allegra      Take 1 tablet (180 mg total) by mouth daily.   Refills: 0     VITAMIN D OR      Take 50,000 Units by mouth once a week. On Sundays   Refills: 0              [place]: [place]    Follow-up appointment:   Jens Burris MD  [address] [place] [id number]  [phone]    Follow up in 3 week(s)  Office will call you for follow up appt.    Appointments for Next 30 Days 2024 - 2024      None            Vital signs:  Temp:  [97.5 °F (36.4 °C)-98.9 °F (37.2 °C)] 98 °F (36.7 °C)  Pulse:  [] 75  Resp:  [16-20] 17  BP: (116-200)/() 143/78  SpO2:  [91 %-100 %] 98 %    Physical Exam:    General: No acute distress   Lungs: clear to auscultation  Cardiovascular: S1, S2  Abdomen: Soft      -----------------------------------------------------------------------------------------------  PATIENT DISCHARGE INSTRUCTIONS: See electronic chart    Marleen Krishnan MD    Total time spent on discharge plannin minutes     The  [employer] Cures Act makes medical notes like these available to patients in the interest of transparency. Please be advised this is a medical document. Medical documents are intended to carry relevant information, facts as evident, and the clinical opinion of the practitioner. The medical note is intended as peer to peer communication and may appear blunt or direct. It is written in medical language and may contain abbreviations or verbiage that are unfamiliar.

## 2024-11-23 NOTE — H&P
[facility]     Clarisse Abbasi Patient Status:  Observation    1970 MRN [id number]   Location [facility] 0SW-A Attending Ramón Orlando MD   Hosp Day # 0 PCP Mery Lopez MD     Chief Complaint: Chest pain    Subjective:    History of Present Illness:     Clarisse Abbasi is a 54 year old female with a significant for morbid obesity, osteoarthrosis, ENRY, dyslipidemia presents to the ER with complaint of chest pain that started approximately 20 minutes prior to arrival.  Patient was seated at the time.  She describes the pain as substernal and burning in sensation.  She took a tablet of nitroglycerin with some improvement.  She also complained of a headache.  Denies radiation of the pain.  She denies associated nausea, vomiting, lightheadedness, dizziness.  She complains of mild shortness of breath as well.  Upon arrival, patient's blood pressure was noted to be elevated.  She denies previous episodes of hypertension.  Blood work was overall unremarkable For mildly elevated D-dimer, troponin was normal.  Chest x-ray and CTA were both unremarkable as well.  Patient received labetalol in the ED with improvement of her blood pressure.  She c/o pressure in her ears but overall feels much better.    History/Other:    Past Medical History:  Past Medical History:    Arthritis    Body piercing    Constipation    Decorative tattoo    Flatulence/gas pain/belching    Hematuria of undiagnosed cause    Hemorrhoids    Osteoarthrosis, unspecified whether generalized or localized, unspecified site    Pain in joints    Sleep apnea    Visual impairment    glasses    Wears glasses     Past Surgical History:   Past Surgical History:   Procedure Laterality Date    Cholecystectomy      Colonoscopy      Egd      Eye surgery      eyelid cyst removal in highschool    Knee arthroscopy Left -    3 of them     Knee arthroscopy Right     1    Knee replacement surgery Left     Removal gallbladder         Family History:   Family History   Problem Relation Age of Onset    Diabetes Maternal Grandmother     Heart Attack Maternal Grandfather     Diabetes Maternal Grandfather     Alcohol and Other Disorders Associated Father      Social History:    reports that she has never smoked. She has never used smokeless tobacco. She reports current alcohol use. She reports that she does not use drugs.     Allergies: Allergies[1]    Medications:  Medications Ordered Prior to Encounter[2]    Review of Systems:   A comprehensive review of systems was completed.    Pertinent positives and negatives noted in the HPI.    Objective:   Physical Exam:    BP (!) 163/87   Pulse 93   Temp 98.9 °F (37.2 °C) (Oral)   Resp 16   Ht 5' 5\" (1.651 m)   Wt 300 lb (136.1 kg)   SpO2 98%   BMI 49.92 kg/m²   General: No acute distress, Alert  Respiratory: No rhonchi, no wheezes  Cardiovascular: S1, S2. Regular rate and rhythm  Abdomen: Soft, Non-tender, non-distended, positive bowel sounds  Neuro: No new focal deficits  Extremities: No edema      Results:    Labs:      Labs Last 24 Hours:    Recent Labs   Lab 11/22/24  1605   RBC 5.43*   HGB 16.8*   HCT 48.3*   MCV 89.0   MCH 30.9   MCHC 34.8   RDW 12.9   NEPRELIM 9.66*   WBC 16.9*   .0       Recent Labs   Lab 11/22/24  1605   GLU 86   BUN 7*   CREATSERUM 0.86   EGFRCR 80   CA 10.3   ALB 4.9*      K 3.2*      CO2 26.0   ALKPHO 162*   AST 19   ALT 14   BILT 0.5   TP 8.0       Lab Results   Component Value Date    INR 0.93 11/22/2024    INR 0.96 08/17/2015       Recent Labs   Lab 11/22/24  1605   TROPHS <3       No results for input(s): \"TROP\", \"PBNP\" in the last 168 hours.    No results for input(s): \"PCT\" in the last 168 hours.    Imaging: Imaging data reviewed in Epic.    Assessment & Plan:      #Chest pain, atypical  Trial of PPi  Monitor on telemetry  2D echo  Cardiology to eval    #Elevated BP, monitor  Could be elevated due to recent stress  IV hydralazine and IV  labetalol PRN  Consider starting 1st line agent on discharge    #DL, statin    #Leukocytosis, chronic    #Morbid obesity, BMI 49    #Hypokalemia, replace    #NERY        Plan of care discussed with pt and RN.    Liz Terrell MD    Supplementary Documentation:     The 21st Century Cures Act makes medical notes like these available to patients in the interest of transparency. Please be advised this is a medical document. Medical documents are intended to carry relevant information, facts as evident, and the clinical opinion of the practitioner. The medical note is intended as peer to peer communication and may appear blunt or direct. It is written in medical language and may contain abbreviations or verbiage that are unfamiliar.                                       [1]   Allergies  Allergen Reactions    Penicillins      As a child, unknown reaction   [2]   No current facility-administered medications on file prior to encounter.     Current Outpatient Medications on File Prior to Encounter   Medication Sig Dispense Refill    atorvastatin 10 MG Oral Tab Take 1 tablet (10 mg total) by mouth nightly.      Ergocalciferol (VITAMIN D OR) Take 50,000 Units by mouth once a week. On Sundays      Fexofenadine HCl 180 MG Oral Tab Take 1 tablet (180 mg total) by mouth daily.

## 2024-11-23 NOTE — PLAN OF CARE
C/o headache treated well with Tyenol.  Tolerating all medications well with no adverse effects. Remains in NSR, NL S1, S2, RRR.  Lungs clear on RA.  Abdomen soft and non tender to touch with active bowel sounds in all four quadrants.  Negative for edema.  Anxious about stress test, but after educating her on what to expect it calmed her nerves greatly.  Were moments of tearfulness, but she did perk up over time.  All needs met by staff.        Problem: CARDIOVASCULAR - ADULT  Goal: Absence of cardiac arrhythmias or at baseline  Description: INTERVENTIONS:  - Continuous cardiac monitoring, monitor vital signs, obtain 12 lead EKG if indicated  - Evaluate effectiveness of antiarrhythmic and heart rate control medications as ordered  - Initiate emergency measures for life threatening arrhythmias  - Monitor electrolytes and administer replacement therapy as ordered  Outcome: Progressing     Problem: PAIN - ADULT  Goal: Verbalizes/displays adequate comfort level or patient's stated pain goal  Description: INTERVENTIONS:  - Encourage pt to monitor pain and request assistance  - Assess pain using appropriate pain scale  - Administer analgesics based on type and severity of pain and evaluate response  - Implement non-pharmacological measures as appropriate and evaluate response  - Consider cultural and social influences on pain and pain management  - Manage/alleviate anxiety  - Utilize distraction and/or relaxation techniques  - Monitor for opioid side effects  - Notify MD/LIP if interventions unsuccessful or patient reports new pain  - Anticipate increased pain with activity and pre-medicate as appropriate  Outcome: Progressing     Problem: Patient/Family Goals  Goal: Patient/Family Long Term Goal  Description: Patient's Long Term Goal: discharge home when medically stable    Interventions:  Telemetry monitoring  O2 protocol,   non-skid socks on,  call light and bedside table within reach, bed in low position  Monitor  intake and output.  Electrolyte protocol.  Lovenox and SCD for VTE  2D echo in am.   - See additional Care Plan goals for specific interventions  Outcome: Progressing  Goal: Patient/Family Short Term Goal  Description: Patient's Short Term Goal: VS stable this shift    Interventions:   Telemetry monitoring  O2 protocol,   non-skid socks on,  call light and bedside table within reach, bed in low position  Monitor intake and output.  Electrolyte protocol.  Lovenox and SCD for VTE  2D echo in am.     - See additional Care Plan goals for specific interventions  Outcome: Progressing

## 2024-11-23 NOTE — PLAN OF CARE
NURSING ADMISSION NOTE      Patient admitted via Ambulance  Oriented to room.  Safety precautions initiated.  Bed in low position.  Call light in reach.    Received patient awake and alert x 4. Spouse at bedside.  O2 protocol: On room air, clear lungs, sats 98%. Denies shortness of breath.  Normal sinus rhythm on telemetry monitoring. Denies chest pain.   Cardiac electrolyte protocol  SCD and Lovenox for VTE prophylaxis.  Offered pain meds for headache  Saline locked.   Had bowel movement today.  Voiding adequately, clear yellow urine.  bed alarm on, call light and bedside table within reach.    Up ad dick.     2D echo in am.     Problem: CARDIOVASCULAR - ADULT  Goal: Absence of cardiac arrhythmias or at baseline  Description: INTERVENTIONS:  - Continuous cardiac monitoring, monitor vital signs, obtain 12 lead EKG if indicated  - Evaluate effectiveness of antiarrhythmic and heart rate control medications as ordered  - Initiate emergency measures for life threatening arrhythmias  - Monitor electrolytes and administer replacement therapy as ordered  Outcome: Progressing     Problem: PAIN - ADULT  Goal: Verbalizes/displays adequate comfort level or patient's stated pain goal  Description: INTERVENTIONS:  - Encourage pt to monitor pain and request assistance  - Assess pain using appropriate pain scale  - Administer analgesics based on type and severity of pain and evaluate response  - Implement non-pharmacological measures as appropriate and evaluate response  - Consider cultural and social influences on pain and pain management  - Manage/alleviate anxiety  - Utilize distraction and/or relaxation techniques  - Monitor for opioid side effects  - Notify MD/LIP if interventions unsuccessful or patient reports new pain  - Anticipate increased pain with activity and pre-medicate as appropriate  Outcome: Progressing     Problem: Patient/Family Goals  Goal: Patient/Family Long Term Goal  Description: Patient's Long Term  Goal: discharge home when medically stable    Interventions:  Telemetry monitoring  O2 protocol,   non-skid socks on,  call light and bedside table within reach, bed in low position  Monitor intake and output.  Electrolyte protocol.  Lovenox and SCD for VTE  2D echo in am.   - See additional Care Plan goals for specific interventions  Outcome: Progressing  Goal: Patient/Family Short Term Goal  Description: Patient's Short Term Goal: VS stable this shift    Interventions:   Telemetry monitoring  O2 protocol,   non-skid socks on,  call light and bedside table within reach, bed in low position  Monitor intake and output.  Electrolyte protocol.  Lovenox and SCD for VTE  2D echo in am.     - See additional Care Plan goals for specific interventions  Outcome: Progressing

## 2024-11-23 NOTE — PROGRESS NOTES
NURSING DISCHARGE NOTE      Pt. Discharged home.  IV removed, tele dc'd and returned to monitor tech.  F/U instructions provided and discussed.  Pt. And family verbalized understanding.  Rx is given.  Discussed adverse reactions and side effects of all new medications and provided appropriate handouts. Pt. And family V/u.   Pt. Wheeled down by staff with all belongings.  Pt. Left denying c/o pain, malaise, or cardiac symptoms.  All needs met by staff.

## 2024-11-23 NOTE — HISTORICAL OFFICE NOTE
Facility Logo [facility]  [address] \[facility]", [address]th [place] [address]  [phone]      Clarisse GARCIA  Progress Note  Demographics:  Name: Clarisse GARCIA YOB: 1970  Age: 54, Female Medical Record No: [id number]  Visited Date/Time: 10/10/2024 08:10 AM    Chief Complaints  follow up   History of Present Illness  No recent medical illness or hospitalization    No chest pain, dyspnea, orthopnea, PND or increased lower extremity pain/swelling.  No palpitations, presyncope or syncope.  No fever, nausea/vomiting, diarrhea.  Has microscopic hematuria since 2007 and had recent ear infection and has cystoscopy and CT scheduled soon by urologist in [place], [place].    Concerned about muscle aches since has many joint issues but is stable OTC MVI and Tumeric/Curcumin.    She has not been taking low dose atorvastatin 10 mg nightly consistently recently.  Cardiac risk factors Dyslipidemia, Obesity and Never smoked  Past Medical History  1.Familial hypercholesteremia  2.Obstructive sleep apnea - NERY  3.Osteoarthritis  4.Arthritis  Past Surgical History  1.History of left knee replacement  2.History of cholecystectomy - Hx  Family History  No significant family history noted.  Social History  Smoking status Never smoked  Tobacco usage - No (Never smoked tobacco (finding))  Alcohol usage - Yes (occasional )  Review of systems  Cardiovascular No history of Chest pain, MADDOX, Palpitations, Syncope, PND, Orthopnea, Edema and Claudication  Physical Examination  Vitals Right Arm Sitting  / 68 mmHg, Pulse rate 82 bpm, Regular, Height in 5' 5\", BMI: 50.2, Weight in 301.6 lbs (or) 136.8 kgs and BSA : 2.59 cc/m²  General Appearance No Acute Distress and Appropriate  Head/Eyes/Ears/Nose/Mouth/Throat Conjunctiva pink, Sclera Clear and Mucous membranes Moist  Neck Normal carotid pulsations, No carotid bruits and No JVD  Respiratory Unlabored, Lungs clear with normal breath sounds and Equal  bilaterally  Cardiovascular Intact distal pulses and Regular rhythm. Normal rate present. Normal and normal S1 and S2    Gastrointestinal Abdomen soft and Non-tender  Musculoskeletal Normal spine  Gait Normal gait  Lower Extremities Pulses 2+ and equal bilaterally and No edema  Skin Warm and dry and Intact  Neurologic / Psychiatric Alert and Oriented  Speech Normal speech  EKG/Other abnormalities  UFCT on 8/10/21 with calcium score Zero Agatston units    EKG performed and personally reviewed at today's office visit showing sinus rhythm with no major ST/T wave changes    [facility] labs 24 10:23  Glucose: 85 Sodium: 141 Potassium: 4.2 BUN: 10 CREATININE: 0.82  AST (SGOT): 18 ALT (SGPT): 11  Cholesterol, Total: 211 (H) Triglycerides: 90 HDL Cholesterol: 51 LDL Cholesterol Ca (H)    Pertinent labs reviewed from [facility] 2023 of potassium 4.3, creatinine 0.80, AST/ALT WNL, lipid profile: , , HDL 57, LDL 96    Pertinent labs reviewed from [employer] [facility] 22 of potassium 4.6, creatinine 0.68, AST/ALT WNL, lipid profile: , , HDL 52,     labs reviewed 10/10/2021 and , AST/ALT WNL    Last lipid profile 2021 through [facility]: , , HDL 51, , potassium 4.6, creatinine 0.86, AST/ALT WNL, glucose 94, hgba1c 5.3  Allergies  1.penicillin - Ingredient(Reaction:other, Severity:Severe)  Medications (Info obtained by: Verbal)  1.atorvastatin 10 mg tablet, Take 1 tablet orally once a day.  2.fexofenadine 180 mg tablet, Take 1 tablet orally once a day.  3.Vitamin D2 1,250 mcg (50,000 unit) capsule, Take 1 capsule orally once a week  Impression  1.Familial hypercholesteremia  Assessment & Plan  55 yo female with familial hypercholesterolemia.    Reviewed Ultrafast CT score and atherosclerotic process.  She would benefit from preventive medications with statin for long-term slowing the atherosclerotic process due to familial hypercholesterolemia.  Tolerating  low dose statin    Hold off on aspirin and consider UFCT in ~2025.    Consider PV screening in the future once available through the office.    Plan:  -continue atorvastatin 10 mg nightly (has not been taking, recommend resuming)  -suggest heart scan, CMP and lipid profile just prior to follow-up with Dr. Burris in a year (will print out lab orders)  -recommend evaluation and treatment at the [facility] weight loss clinic (will have staff give information again)    Increased BMI: Provide patient with information regarding diet and lifestyle changes.  Labs and Diagnostics ordered  1.EKG (electrocardiogram) (Schedule next available)  2.CMP (1 Year)  3.Lipid panel, Fasting (1 Year)  Future appointments  1.Referral Visit - Christiano Lisymamtami ([email or web address]) : (Today)  2.Follow up visit - Jens Burris MD (1 Year)  Miscellaneous  1.Reviewed glucose, sodium, potassium, chloride, co2, anion gap, bun, creatinine, calcium, osmolality calculated, e gfr cr, ast, alt, alkaline phosphatase, bilirubin, total, total protein, albumin, globulin, a/g ratio, fasting patient cmp answer, cholesterol, hdl chol, triglycerides, ldl cholestrol, vldl, non hdl chol and fasting patient lipid answer with the patient.  2.Reviewed trans thoracic echocardiogram with the patient.  3.Weight monitoring (regime/therapy)  Nurses documentation  Triage - Nursing Doc  Upcoming surgeries: none  Use of assistive devices(s): none  Refills: none  EKG: done  Triage & medication list reviewed by: AP, CCMA  Patient instructions  1. Continue atorvastatin 10mg nightly.  2. Complete lab work and a heart scan before your follow up with Dr. Burris in 1 year.  Lab Details  COMP METABOLIC PANEL (14)  09/27/2024 05:56:33 PM  GLUCOSE 85 70-99 mg/dL  F  SODIUM 141 136-145 mmol/L  F  POTASSIUM 4.2 3.5-5.1 mmol/L  F  CHLORIDE 106  mmol/L  F  CO2 28.0 21.0-32.0 mmol/L  F  ANION GAP 7 0-18 mmol/L  F  BUN 10 9-23 mg/dL  F  CREATININE 0.82 0.55-1.02  mg/dL  F  CALCIUM 10.1 8.7-10.4 mg/dL  F  OSMOLALITY CALCULATED 290 275-295 mOsm/kg  F  E GFR CR 84 >=60 mL/min/1.73m2  F  AST 18 <34 U/L  F  ALT 11 10-49 U/L  F  ALKALINE PHOSPHATASE 155  U/L H F  BILIRUBIN, TOTAL 0.6 0.3-1.2 mg/dL  F  TOTAL PROTEIN 7.6 5.7-8.2 g/dL  F  ALBUMIN 4.7 3.2-4.8 g/dL  F  GLOBULIN 2.9 2.0-3.5 g/dL  F  A/G RATIO 1.6 1.0-2.0  F  FASTING PATIENT CMP ANSWER Yes   F  LIPID PANEL  09/27/2024 05:56:33 PM  CHOLESTEROL 211 <200 mg/dL H F  HDL CHOL 51 40-59 mg/dL  F  TRIGLYCERIDES 90  mg/dL  F  LDL CHOLESTROL 144 <100 mg/dL H F  VLDL 17 0-30 mg/dL  F  NON HDL CHOL 160 <130 mg/dL H F  FASTING PATIENT LIPID ANSWER Yes   F  Diagnostics Details  Trans Thoracic Echocardiogram 08/12/2021  1.The study quality is average.    2.The left ventricle is normal in size. The left ventricular ejection fraction is 65%. Global left ventricular systolic function is normal. Left ventricular diastolic function is normal. No regional wall motion abnormality.    3.Normal right ventricle size and systolic function.    4.No significant valvular regurgitation or stenosis.    CPOE Orders carried out by: Akilah Wei and Hailey Shay  Care Providers: Jens Burris MD, Akilah Wei and Hailey Shay  Electronically Authenticated by  Jens Burris MD  10/10/2024 10:25:54 AM  Disclaimer: Components of this note were documented using voice recognition system and are subject to errors not corrected at proofreading. Contact the author of this note for any clarifications.

## 2025-01-18 ENCOUNTER — HOSPITAL ENCOUNTER (OUTPATIENT)
Dept: CT IMAGING | Age: 55
Discharge: HOME | End: 2025-01-18
Attending: INTERNAL MEDICINE

## 2025-01-18 DIAGNOSIS — Z13.6 SCREENING FOR HEART DISEASE: ICD-10-CM

## (undated) NOTE — ED AVS SNAPSHOT
Monica Mendez   MRN: [id number]    Department:  THE [facility] Emergency Department in [place]   Date of Visit:  11/18/2017           Disclosure     Insurance plans vary and the physician(s) referred by the ER may not be covered by your plan.  Please contac If you have been prescribed any medication(s), please fill your prescription right away and begin taking the medication(s) as directed    If the emergency physician has read X-rays, these will be re-interpreted by a radiologist.  If there is a significant